# Patient Record
Sex: FEMALE | URBAN - METROPOLITAN AREA
[De-identification: names, ages, dates, MRNs, and addresses within clinical notes are randomized per-mention and may not be internally consistent; named-entity substitution may affect disease eponyms.]

---

## 2019-04-24 ENCOUNTER — EMERGENCY (EMERGENCY)
Facility: HOSPITAL | Age: 49
LOS: 1 days | End: 2019-04-24
Admitting: EMERGENCY MEDICINE
Payer: SELF-PAY

## 2019-04-24 PROCEDURE — 99283 EMERGENCY DEPT VISIT LOW MDM: CPT

## 2019-04-24 PROCEDURE — 73562 X-RAY EXAM OF KNEE 3: CPT | Mod: 26,RT

## 2019-04-24 PROCEDURE — 71046 X-RAY EXAM CHEST 2 VIEWS: CPT | Mod: 26

## 2021-01-14 ENCOUNTER — TRANSCRIPTION ENCOUNTER (OUTPATIENT)
Age: 51
End: 2021-01-14

## 2023-05-02 PROBLEM — Z00.00 ENCOUNTER FOR PREVENTIVE HEALTH EXAMINATION: Status: ACTIVE | Noted: 2023-05-02

## 2023-05-09 ENCOUNTER — APPOINTMENT (OUTPATIENT)
Dept: CARDIOLOGY | Facility: CLINIC | Age: 53
End: 2023-05-09
Payer: MEDICAID

## 2023-05-09 VITALS — DIASTOLIC BLOOD PRESSURE: 80 MMHG | SYSTOLIC BLOOD PRESSURE: 120 MMHG

## 2023-05-09 VITALS
HEIGHT: 62 IN | BODY MASS INDEX: 30.55 KG/M2 | WEIGHT: 166 LBS | DIASTOLIC BLOOD PRESSURE: 80 MMHG | OXYGEN SATURATION: 98 % | SYSTOLIC BLOOD PRESSURE: 124 MMHG | HEART RATE: 79 BPM

## 2023-05-09 DIAGNOSIS — Z82.49 FAMILY HISTORY OF ISCHEMIC HEART DISEASE AND OTHER DISEASES OF THE CIRCULATORY SYSTEM: ICD-10-CM

## 2023-05-09 DIAGNOSIS — Z83.3 FAMILY HISTORY OF DIABETES MELLITUS: ICD-10-CM

## 2023-05-09 DIAGNOSIS — Z87.898 PERSONAL HISTORY OF OTHER SPECIFIED CONDITIONS: ICD-10-CM

## 2023-05-09 DIAGNOSIS — R73.03 PREDIABETES.: ICD-10-CM

## 2023-05-09 DIAGNOSIS — Z80.49 FAMILY HISTORY OF MALIGNANT NEOPLASM OF OTHER GENITAL ORGANS: ICD-10-CM

## 2023-05-09 DIAGNOSIS — Z78.9 OTHER SPECIFIED HEALTH STATUS: ICD-10-CM

## 2023-05-09 DIAGNOSIS — Z86.79 PERSONAL HISTORY OF OTHER DISEASES OF THE CIRCULATORY SYSTEM: ICD-10-CM

## 2023-05-09 PROCEDURE — 93242 EXT ECG>48HR<7D RECORDING: CPT | Mod: 59

## 2023-05-09 PROCEDURE — 99203 OFFICE O/P NEW LOW 30 MIN: CPT

## 2023-05-09 RX ORDER — ACETAMINOPHEN/DIPHENHYDRAMINE 500MG-25MG
1000 TABLET ORAL DAILY
Refills: 0 | Status: ACTIVE | COMMUNITY

## 2023-05-09 RX ORDER — ERGOCALCIFEROL 1.25 MG/1
1.25 MG CAPSULE, LIQUID FILLED ORAL
Qty: 4 | Refills: 0 | Status: ACTIVE | COMMUNITY
Start: 2023-04-07

## 2023-05-09 RX ORDER — METOPROLOL SUCCINATE 100 MG/1
100 TABLET, EXTENDED RELEASE ORAL DAILY
Refills: 0 | Status: ACTIVE | COMMUNITY

## 2023-05-09 RX ORDER — OMEGA-3/DHA/EPA/FISH OIL 300-1000MG
CAPSULE ORAL DAILY
Refills: 0 | Status: ACTIVE | COMMUNITY

## 2023-05-09 NOTE — ASSESSMENT
[FreeTextEntry1] : Rand is a 52-year-old Venezuelan-speaking female with medical history detailed above and active medical issues including:\par \par - Hospital follow-up [facility] 3/15/2023 palpitations, tremor, abnormal EKG with anterior ischemic changes.  Recurrent palpitations since discharge on Toprol.  Coronary CTA and coronary calcium score ordered to assess for obstructive CAD and risk stratification.  Echocardiogram ordered to evaluate for structural heart disease, carotid and abdominal ultrasound to evaluate for PAD.  Zio patch 2-week active heart monitor started today.  Recommended increased oral hydration with electrolyte suppliment drinks, avoid caffeine and alcohol intake.\par \par - Hypertension office BP at guideline goal on Toprol\par \par - Strong family history of premature CAD, brother  age 52 MI, mother MI at age 65, father MI at age 55.\par \par - Obesity.  Discussed calorie reduction and increased exercise as a weight reduction strategy.\par \par - Prediabetes, diet modification discussed.\par \par Advised patient to follow active lifestyle with regular cardiovascular exercise. Patient educated on heart healthy diet. Recommend increased oral hydration with electrolyte suppliment drinks, avoid excess alcohol and caffeine.  Patient is aware to call with any symptoms or concerns. \par \par Cardiology follow-up after noninvasive testing.\par \par Rand will follow up with PCP for primary care

## 2023-05-09 NOTE — HISTORY OF PRESENT ILLNESS
[FreeTextEntry1] : Rand is a 52-year-old [place]-speaking female with history of HTN, prediabetes, obesity, fibrocystic breast, chronic low back pain, strong family history of premature CAD brother  age 52 MI, mother MI at age 65, father MI at age 55.\par \par Medical assistant in office used for .\par \par No history of CAD, MI, revascularization, VHD, CHF, TIA, CVA, diabetes, PVD, DVT, PE, arrhythmia, AF.\par \par Patient admitted [facility] 3/15/2023 with palpitations and tremor normal EKG, normal labs discharged for outpatient cardiology ischemic evaluation.  Since discharge patient has recurrent palpitations racing heartbeat lasting up to several hours.  Patient has dyspnea with moderate exertion.  Cardiovascular review of symptoms is negative for exertional chest pain, dizziness or syncope.  No PND or orthopnea leg edema.  No bleeding or black stool.\par \par No exercise routine.  Patient is walking 15 minutes on occasion.  On occasion she goes to the gym using elliptical exercising machine.\par \par EKG [facility] 2023 sinus rhythm anterior T wave inversions consistent with ischemia\par \par

## 2023-05-24 ENCOUNTER — APPOINTMENT (OUTPATIENT)
Dept: CARDIOLOGY | Facility: CLINIC | Age: 53
End: 2023-05-24
Payer: MEDICAID

## 2023-05-24 PROCEDURE — 93880 EXTRACRANIAL BILAT STUDY: CPT

## 2023-05-24 PROCEDURE — 93306 TTE W/DOPPLER COMPLETE: CPT

## 2023-05-24 PROCEDURE — 93979 VASCULAR STUDY: CPT

## 2023-05-25 ENCOUNTER — APPOINTMENT (OUTPATIENT)
Dept: CARDIOLOGY | Facility: CLINIC | Age: 53
End: 2023-05-25
Payer: MEDICAID

## 2023-05-25 PROCEDURE — 93244 EXT ECG>48HR<7D REV&INTERPJ: CPT

## 2023-06-30 ENCOUNTER — APPOINTMENT (OUTPATIENT)
Dept: MAMMOGRAPHY | Facility: CLINIC | Age: 53
End: 2023-06-30
Payer: MEDICAID

## 2023-06-30 ENCOUNTER — APPOINTMENT (OUTPATIENT)
Dept: ULTRASOUND IMAGING | Facility: CLINIC | Age: 53
End: 2023-06-30

## 2023-06-30 PROCEDURE — 77067 SCR MAMMO BI INCL CAD: CPT

## 2023-06-30 PROCEDURE — 77063 BREAST TOMOSYNTHESIS BI: CPT

## 2023-07-21 ENCOUNTER — APPOINTMENT (OUTPATIENT)
Dept: CARDIOLOGY | Facility: CLINIC | Age: 53
End: 2023-07-21
Payer: MEDICAID

## 2023-07-21 PROCEDURE — 93015 CV STRESS TEST SUPVJ I&R: CPT

## 2023-07-27 ENCOUNTER — APPOINTMENT (OUTPATIENT)
Dept: CARDIOLOGY | Facility: CLINIC | Age: 53
End: 2023-07-27

## 2023-08-03 PROBLEM — Z00.00 ENCOUNTER FOR PREVENTIVE HEALTH EXAMINATION: Status: ACTIVE | Noted: 2023-08-03

## 2023-08-09 ENCOUNTER — APPOINTMENT (OUTPATIENT)
Dept: CARDIOLOGY | Facility: CLINIC | Age: 53
End: 2023-08-09
Payer: MEDICAID

## 2023-08-09 VITALS
WEIGHT: 168 LBS | BODY MASS INDEX: 30.73 KG/M2 | SYSTOLIC BLOOD PRESSURE: 132 MMHG | OXYGEN SATURATION: 98 % | HEART RATE: 72 BPM | DIASTOLIC BLOOD PRESSURE: 84 MMHG

## 2023-08-09 DIAGNOSIS — R06.09 OTHER FORMS OF DYSPNEA: ICD-10-CM

## 2023-08-09 DIAGNOSIS — R94.31 ABNORMAL ELECTROCARDIOGRAM [ECG] [EKG]: ICD-10-CM

## 2023-08-09 DIAGNOSIS — R00.2 PALPITATIONS: ICD-10-CM

## 2023-08-09 PROCEDURE — 99214 OFFICE O/P EST MOD 30 MIN: CPT

## 2023-08-09 NOTE — HISTORY OF PRESENT ILLNESS
[FreeTextEntry1] : NIMISHA BALLARD is a 53 year old female with a past medical history of [place]-speaking female with history of HTN, prediabetes, obesity, fibrocystic breast, chronic low back pain, strong family history of premature CAD brother  age 52 MI, mother MI at age 65, father MI at age 55.  Patient admitted [facility] 3/15/2023 with palpitations and tremor normal EKG, normal labs discharged for outpatient cardiology ischemic evaluation.   Last seen 23. CTA heart, echo, carotid, abd, and Zio  ordered and she presents today for f/u. See details below. CTA heart was not completed. In the interim there have been no hospitalizations or procedures. Patient reports heartburn, palpitations, and dizziness at times. Also a "warmth and pain in her head". Denies SOB, syncope, claudication, and edema. Walks 40 minutes for exercise without CP or SOB, but notes fatigue.  [place]  ID: Alan, [id number]  Testing:  ETT 23:Conclusions: 1. The ECG is negative for ischemia. 2. The Duke Treadmill Score is 9.00, which is consistent with low risk (5) for future cardiac events.  Echo 23:CONCLUSIONS: 1. The left ventricular systolic function is normal with an ejection fraction visually estimated at 60 to 65  %. 2. Basal inferoseptal segment and basal inferior segment are abnormal. 3. There is normal left ventricular diastolic function. 4. Normal right ventricular cavity size, normal wall thickness and normal systolic function. 5. Trace mitral regurgitation. 6. No echocardiographic evidence of pulmonary hypertension. 7. No pericardial effusion seen. 8. No prior echocardiogram is available for comparison.  Carotid u/s 23: CONCLUSIONS: 1. Right: proximal ICA no stenosis. VELIA is tortuous. 2. Left: proximal ICA no stenosis. LICA tortuous.  3. Vertebral arteries: antegrade flow bilaterally. 4. No prior exam is available for comparison  Abd u/s 23: CONCLUSIONS: 1. No evidence of abdominal aortic aneurysm. 2. No prior exam is available for comparison.  Zio 23-23: SR  bpm, average HR 74 bpm, nonspecific ST-T wave abnormalities, PAC burden <1%. PVC burden <1%. +sx's with SR and artifact.  Labs 3/16/23: WBC 9.74, Hgb 12, HCT 36.5, plt 305, Na 138, K 3.7, Cr 0.58, AST 25, ALT 49, Ca 9.8, Mag 1.7, Trop <0.010,   EKG [facility] 2023 sinus rhythm PRWP, 83 bpm, GA interval 168 ms, QTc 460 ms, anterior T wave inversions consistent with ischemia, nonspecific ST-T wave abnormalities

## 2023-08-09 NOTE — DISCUSSION/SUMMARY
[FreeTextEntry1] : NIMISHA BALLARD is a 53 year old F who presents today Aug 09, 2023 with the above history and the following active issues:  WMA on echo 2023 with heart burn. R/B of CTA heart discussed. Proceed with CTA heart. Cr 0.58 on labs 3/16/23. Denies allergy to IV dye/contrast. Denies pregnancy. Will check labs prior to testing. HCG and Cr included.  HTN: BP moderately well controlled on Toprol XL. Educated patient on low salt diet, alcohol intake in moderation, regular cardiovascular exercise, and weight reduction for improved BP control. Continue to monitor BP at home and call for persistently elevated readings (>140/90).    Strong family history of premature CAD, brother  age 52 MI, mother MI at age 65, father MI at age 55.   Obesity. Discussed calorie reduction and increased exercise as a weight reduction strategy.   Prediabetes, diet modification discussed.  Ongoing f/u with PCP.  F/U after testing to review results. Discussed red flag symptoms, which would warrant sooner or emergent medical evaluation. Any questions and concerns were addressed and resolved.  Sincerely, Ruby Gonzalez [facility] Patient's history, testing, and plan was reviewed with supervising physician, Dr. Umang Joya   DISPLAY PLAN FREE TEXT

## 2024-07-09 ENCOUNTER — APPOINTMENT (OUTPATIENT)
Dept: MAMMOGRAPHY | Facility: CLINIC | Age: 54
End: 2024-07-09
Payer: MEDICAID

## 2024-07-09 PROCEDURE — 77063 BREAST TOMOSYNTHESIS BI: CPT

## 2024-07-09 PROCEDURE — 77067 SCR MAMMO BI INCL CAD: CPT

## 2025-07-23 PROBLEM — Z00.00 ENCOUNTER FOR PREVENTIVE HEALTH EXAMINATION: Status: ACTIVE | Noted: 2025-07-23

## 2025-08-18 ENCOUNTER — APPOINTMENT (OUTPATIENT)
Dept: MAMMOGRAPHY | Facility: CLINIC | Age: 55
End: 2025-08-18
Payer: MEDICAID

## 2025-08-18 ENCOUNTER — RESULT REVIEW (OUTPATIENT)
Age: 55
End: 2025-08-18

## 2025-08-18 PROCEDURE — 77063 BREAST TOMOSYNTHESIS BI: CPT

## 2025-08-18 PROCEDURE — 77067 SCR MAMMO BI INCL CAD: CPT

## 2025-08-21 ENCOUNTER — OUTPATIENT (OUTPATIENT)
Dept: OUTPATIENT SERVICES | Facility: HOSPITAL | Age: 55
LOS: 1 days | End: 2025-08-21
Payer: MEDICAID

## 2025-08-21 ENCOUNTER — RESULT REVIEW (OUTPATIENT)
Age: 55
End: 2025-08-21

## 2025-08-21 ENCOUNTER — APPOINTMENT (OUTPATIENT)
Dept: ULTRASOUND IMAGING | Facility: CLINIC | Age: 55
End: 2025-08-21

## 2025-08-21 ENCOUNTER — APPOINTMENT (OUTPATIENT)
Dept: MAMMOGRAPHY | Facility: CLINIC | Age: 55
End: 2025-08-21

## 2025-08-21 DIAGNOSIS — Z00.8 ENCOUNTER FOR OTHER GENERAL EXAMINATION: ICD-10-CM

## 2025-08-21 PROCEDURE — 77065 DX MAMMO INCL CAD UNI: CPT | Mod: 26,RT

## 2025-08-21 PROCEDURE — 76642 ULTRASOUND BREAST LIMITED: CPT

## 2025-08-21 PROCEDURE — 76642 ULTRASOUND BREAST LIMITED: CPT | Mod: 26,RT

## 2025-08-21 PROCEDURE — 77061 BREAST TOMOSYNTHESIS UNI: CPT | Mod: 26

## 2025-08-21 PROCEDURE — 77065 DX MAMMO INCL CAD UNI: CPT

## 2025-08-21 PROCEDURE — G0279: CPT

## 2025-08-29 ENCOUNTER — NON-APPOINTMENT (OUTPATIENT)
Age: 55
End: 2025-08-29